# Patient Record
Sex: MALE | Race: BLACK OR AFRICAN AMERICAN | Employment: FULL TIME | URBAN - METROPOLITAN AREA
[De-identification: names, ages, dates, MRNs, and addresses within clinical notes are randomized per-mention and may not be internally consistent; named-entity substitution may affect disease eponyms.]

---

## 2022-09-15 ENCOUNTER — HOSPITAL ENCOUNTER (EMERGENCY)
Facility: HOSPITAL | Age: 37
Discharge: HOME | End: 2022-09-15
Attending: EMERGENCY MEDICINE

## 2022-09-15 VITALS
OXYGEN SATURATION: 96 % | DIASTOLIC BLOOD PRESSURE: 66 MMHG | BODY MASS INDEX: 27.83 KG/M2 | HEART RATE: 93 BPM | RESPIRATION RATE: 20 BRPM | TEMPERATURE: 98 F | SYSTOLIC BLOOD PRESSURE: 121 MMHG | HEIGHT: 73 IN | WEIGHT: 210 LBS

## 2022-09-15 DIAGNOSIS — M25.461 EFFUSION OF RIGHT KNEE: Primary | ICD-10-CM

## 2022-09-15 PROCEDURE — 99282 EMERGENCY DEPT VISIT SF MDM: CPT

## 2022-09-15 NOTE — ED INITIAL ASSESSMENT (HPI)
Pt to ED with c/o acute/chronic right knee pain and swelling. Pt ambulating by self with steady gait. Denies fever or trauma.

## (undated) NOTE — LETTER
Date & Time: 9/15/2022, 4:21 PM  Patient: Milan Camacho  Encounter Provider(s):    Caroline Noble MD       To Whom It May Concern:    Milan Camacho was seen and treated in our department on 9/15/2022. He can return to work with these limitations: wear knee brace at work.     If you have any questions or concerns, please do not hesitate to call.        _____________________________  Physician/[facility] Signature